# Patient Record
Sex: FEMALE | Race: BLACK OR AFRICAN AMERICAN | NOT HISPANIC OR LATINO | Employment: OTHER | URBAN - METROPOLITAN AREA
[De-identification: names, ages, dates, MRNs, and addresses within clinical notes are randomized per-mention and may not be internally consistent; named-entity substitution may affect disease eponyms.]

---

## 2017-01-04 ENCOUNTER — OFFICE VISIT (OUTPATIENT)
Dept: SURGERY | Facility: CLINIC | Age: 72
End: 2017-01-04
Payer: MEDICARE

## 2017-01-04 VITALS
HEIGHT: 64 IN | WEIGHT: 198 LBS | SYSTOLIC BLOOD PRESSURE: 145 MMHG | BODY MASS INDEX: 33.8 KG/M2 | TEMPERATURE: 98 F | DIASTOLIC BLOOD PRESSURE: 84 MMHG | HEART RATE: 73 BPM

## 2017-01-04 DIAGNOSIS — Z09 POSTOP CHECK: Primary | ICD-10-CM

## 2017-01-04 PROCEDURE — 99024 POSTOP FOLLOW-UP VISIT: CPT | Mod: S$GLB,,, | Performed by: NURSE PRACTITIONER

## 2017-01-04 PROCEDURE — 99999 PR PBB SHADOW E&M-EST. PATIENT-LVL III: CPT | Mod: PBBFAC,,, | Performed by: NURSE PRACTITIONER

## 2017-01-04 NOTE — PROGRESS NOTES
Ms. 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 She denies fever, pain, SOB, drainage, tenderness, chills, malaise, N/V, constipation. She developed mild diarrhea on Monday- has about 2 to 3 loose stools/day. Has not been on new antibiotic regimen. Has been eating more rich food than usual. She will see her medical oncologist in early January and will follow up w him again. She will try eating more bland foods- BRAT diet and increased fluid. Will call if diarrhea doesn't go away or gets more frequent.      PHYSICAL EXAM:  Physical Exam   Constitutional: She appears well-developed and well-nourished. She is cooperative. She does not appear ill. No distress.   Mild fatigue since having diarrhea- started Monday. No jaundice, chills, sweats.   Cardiovascular: Normal rate, regular rhythm, S1 normal, S2 normal and normal heart sounds.   Pulses:  Radial pulses are 2+ on the right side, and 2+ on the left side.   No edema   Pulmonary/Chest: Effort normal and breath sounds normal.   Abdominal: Soft. Bowel sounds are normal.  No hernia. Seroma still present-resolving. No erythema, tenderness, drainage.   Neurological: She is alert.           ASSESSMENT:  Doing well post op      PLAN:    1. Watson diet, rest when needed, increase fluids. If no resolution of diarrhea or gets worse, will check for C diff.   2. Call if any questions, concerns, otherwise follow up w medical oncology and call for an appt PRN